# Patient Record
Sex: FEMALE
[De-identification: names, ages, dates, MRNs, and addresses within clinical notes are randomized per-mention and may not be internally consistent; named-entity substitution may affect disease eponyms.]

---

## 2021-04-14 PROBLEM — Z00.00 ENCOUNTER FOR PREVENTIVE HEALTH EXAMINATION: Status: ACTIVE | Noted: 2021-04-14

## 2021-04-27 ENCOUNTER — NON-APPOINTMENT (OUTPATIENT)
Age: 21
End: 2021-04-27

## 2021-04-27 ENCOUNTER — APPOINTMENT (OUTPATIENT)
Dept: FAMILY MEDICINE | Facility: CLINIC | Age: 21
End: 2021-04-27
Payer: COMMERCIAL

## 2021-04-27 VITALS
TEMPERATURE: 96.6 F | DIASTOLIC BLOOD PRESSURE: 70 MMHG | BODY MASS INDEX: 19.61 KG/M2 | WEIGHT: 137 LBS | OXYGEN SATURATION: 98 % | HEART RATE: 76 BPM | HEIGHT: 70 IN | SYSTOLIC BLOOD PRESSURE: 118 MMHG

## 2021-04-27 DIAGNOSIS — Z82.5 FAMILY HISTORY OF ASTHMA AND OTHER CHRONIC LOWER RESPIRATORY DISEASES: ICD-10-CM

## 2021-04-27 DIAGNOSIS — Z82.69 FAMILY HISTORY OF OTHER DISEASES OF THE MUSCULOSKELETAL SYSTEM AND CONNECTIVE TISSUE: ICD-10-CM

## 2021-04-27 DIAGNOSIS — Z87.81 PERSONAL HISTORY OF (HEALED) TRAUMATIC FRACTURE: ICD-10-CM

## 2021-04-27 DIAGNOSIS — Z78.9 OTHER SPECIFIED HEALTH STATUS: ICD-10-CM

## 2021-04-27 DIAGNOSIS — Z83.438 FAMILY HISTORY OF OTHER DISORDER OF LIPOPROTEIN METABOLISM AND OTHER LIPIDEMIA: ICD-10-CM

## 2021-04-27 DIAGNOSIS — Z87.01 PERSONAL HISTORY OF PNEUMONIA (RECURRENT): ICD-10-CM

## 2021-04-27 DIAGNOSIS — Z86.14 PERSONAL HISTORY OF METHICILLIN RESISTANT STAPHYLOCOCCUS AUREUS INFECTION: ICD-10-CM

## 2021-04-27 DIAGNOSIS — Z00.00 ENCOUNTER FOR GENERAL ADULT MEDICAL EXAMINATION W/OUT ABNORMAL FINDINGS: ICD-10-CM

## 2021-04-27 DIAGNOSIS — Z86.39 PERSONAL HISTORY OF OTHER ENDOCRINE, NUTRITIONAL AND METABOLIC DISEASE: ICD-10-CM

## 2021-04-27 PROCEDURE — 36415 COLL VENOUS BLD VENIPUNCTURE: CPT

## 2021-04-27 PROCEDURE — 99072 ADDL SUPL MATRL&STAF TM PHE: CPT

## 2021-04-27 PROCEDURE — 99395 PREV VISIT EST AGE 18-39: CPT | Mod: 25

## 2021-04-27 NOTE — PLAN
[FreeTextEntry1] : \par \par Patient had a normal exam , fasting labs were performed today\par Patient advised to consult with ORtho Spine/Breast surgeon/Plastic Surgeon  for her upper back pain complaints , to discuss if Breast Reduction would alleviate her discomfort \par Will return in one year for PE

## 2021-04-27 NOTE — PHYSICAL EXAM
Thank you for your visit today.  Please call me with any questions or concerns.   Yifan Hunter RN  Cardiology Care Coordinator  [phone], press option 1 then option 3   [No Acute Distress] : no acute distress [Well Nourished] : well nourished [Well Developed] : well developed [Well-Appearing] : well-appearing [Normal Sclera/Conjunctiva] : normal sclera/conjunctiva [PERRL] : pupils equal round and reactive to light [EOMI] : extraocular movements intact [Normal Outer Ear/Nose] : the outer ears and nose were normal in appearance [Normal Oropharynx] : the oropharynx was normal [No JVD] : no jugular venous distention [No Lymphadenopathy] : no lymphadenopathy [Supple] : supple [Thyroid Normal, No Nodules] : the thyroid was normal and there were no nodules present [No Respiratory Distress] : no respiratory distress  [No Accessory Muscle Use] : no accessory muscle use [Clear to Auscultation] : lungs were clear to auscultation bilaterally [Normal Rate] : normal rate  [Regular Rhythm] : with a regular rhythm [Normal S1, S2] : normal S1 and S2 [No Murmur] : no murmur heard [No Carotid Bruits] : no carotid bruits [No Abdominal Bruit] : a ~M bruit was not heard ~T in the abdomen [No Varicosities] : no varicosities [Pedal Pulses Present] : the pedal pulses are present [No Edema] : there was no peripheral edema [No Palpable Aorta] : no palpable aorta [No Extremity Clubbing/Cyanosis] : no extremity clubbing/cyanosis [Soft] : abdomen soft [Non Tender] : non-tender [Non-distended] : non-distended [No Masses] : no abdominal mass palpated [No HSM] : no HSM [Normal Bowel Sounds] : normal bowel sounds [Normal Posterior Cervical Nodes] : no posterior cervical lymphadenopathy [Normal Anterior Cervical Nodes] : no anterior cervical lymphadenopathy [No CVA Tenderness] : no CVA  tenderness [No Spinal Tenderness] : no spinal tenderness [No Joint Swelling] : no joint swelling [Grossly Normal Strength/Tone] : grossly normal strength/tone [No Rash] : no rash [Coordination Grossly Intact] : coordination grossly intact [No Focal Deficits] : no focal deficits [Normal Gait] : normal gait [Deep Tendon Reflexes (DTR)] : deep tendon reflexes were 2+ and symmetric [Normal Affect] : the affect was normal [Normal Insight/Judgement] : insight and judgment were intact

## 2021-04-27 NOTE — HEALTH RISK ASSESSMENT
[Very Good] : ~his/her~  mood as very good [Yes] : Yes [Monthly or less (1 pt)] : Monthly or less (1 point) [1 or 2 (0 pts)] : 1 or 2 (0 points) [Never (0 pts)] : Never (0 points) [No] : In the past 12 months have you used drugs other than those required for medical reasons? No [0] : 2) Feeling down, depressed, or hopeless: Not at all (0) [With Family] : lives with family [# of Members in Household ___] :  household currently consist of [unfilled] member(s) [Student] : student [College] : College [Single] : single [Feels Safe at Home] : Feels safe at home [Reports normal functional visual acuity (ie: able to read med bottle)] : Reports normal functional visual acuity [Smoke Detector] : smoke detector [Carbon Monoxide Detector] : carbon monoxide detector [Seat Belt] :  uses seat belt [Sunscreen] : uses sunscreen [] : No [de-identified] : walking daily  [de-identified] : moderately healthy  [Sexually Active] : not sexually active [Reports changes in hearing] : Reports no changes in hearing [Reports changes in vision] : Reports no changes in vision [Reports changes in dental health] : Reports no changes in dental health [PapSmearComments] : never  [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

## 2021-04-27 NOTE — HISTORY OF PRESENT ILLNESS
[de-identified] : Patient is here for Yearly PE , last PE was several years ago . She is feeling well .\par Patient is vaccinated against Covid-19  \par Patient is complaining of upper back pain x 1 year, she is requesting referral to Ortho SPine or Breast surgeon , as she wants to consider Breast reduction

## 2021-04-28 LAB
ALBUMIN SERPL ELPH-MCNC: 5 G/DL
ALP BLD-CCNC: 89 U/L
ALT SERPL-CCNC: 6 U/L
ANION GAP SERPL CALC-SCNC: 10 MMOL/L
AST SERPL-CCNC: 15 U/L
BILIRUB SERPL-MCNC: 0.3 MG/DL
BUN SERPL-MCNC: 10 MG/DL
CALCIUM SERPL-MCNC: 9.8 MG/DL
CHLORIDE SERPL-SCNC: 105 MMOL/L
CHOLEST SERPL-MCNC: 176 MG/DL
CO2 SERPL-SCNC: 24 MMOL/L
CREAT SERPL-MCNC: 0.79 MG/DL
GLUCOSE SERPL-MCNC: 83 MG/DL
HDLC SERPL-MCNC: 69 MG/DL
LDLC SERPL CALC-MCNC: 95 MG/DL
NONHDLC SERPL-MCNC: 107 MG/DL
POTASSIUM SERPL-SCNC: 4.7 MMOL/L
PROT SERPL-MCNC: 7.5 G/DL
SODIUM SERPL-SCNC: 139 MMOL/L
TRIGL SERPL-MCNC: 58 MG/DL
TSH SERPL-ACNC: 0.96 UIU/ML

## 2021-04-29 LAB
BASOPHILS # BLD AUTO: 0.01 K/UL
BASOPHILS NFR BLD AUTO: 0.2 %
EOSINOPHIL # BLD AUTO: 0.04 K/UL
EOSINOPHIL NFR BLD AUTO: 0.9 %
HCT VFR BLD CALC: 42.3 %
HGB BLD-MCNC: 13.7 G/DL
IMM GRANULOCYTES NFR BLD AUTO: 0.2 %
LYMPHOCYTES # BLD AUTO: 1.3 K/UL
LYMPHOCYTES NFR BLD AUTO: 30.4 %
MAN DIFF?: NORMAL
MCHC RBC-ENTMCNC: 29.8 PG
MCHC RBC-ENTMCNC: 32.4 GM/DL
MCV RBC AUTO: 92.2 FL
MONOCYTES # BLD AUTO: 0.2 K/UL
MONOCYTES NFR BLD AUTO: 4.7 %
NEUTROPHILS # BLD AUTO: 2.71 K/UL
NEUTROPHILS NFR BLD AUTO: 63.6 %
PLATELET # BLD AUTO: 233 K/UL
RBC # BLD: 4.59 M/UL
RBC # FLD: 12.7 %
WBC # FLD AUTO: 4.27 K/UL